# Patient Record
Sex: MALE | Race: WHITE
[De-identification: names, ages, dates, MRNs, and addresses within clinical notes are randomized per-mention and may not be internally consistent; named-entity substitution may affect disease eponyms.]

---

## 2021-01-06 ENCOUNTER — HOSPITAL ENCOUNTER (EMERGENCY)
Dept: HOSPITAL 12 - ER | Age: 26
Discharge: LEFT BEFORE DISCHARGE | End: 2021-01-06
Payer: SELF-PAY

## 2021-01-06 DIAGNOSIS — Z75.3: Primary | ICD-10-CM

## 2021-01-09 ENCOUNTER — HOSPITAL ENCOUNTER (EMERGENCY)
Dept: HOSPITAL 12 - ER | Age: 26
Discharge: HOME | End: 2021-01-09
Payer: SELF-PAY

## 2021-01-09 VITALS — WEIGHT: 129 LBS | BODY MASS INDEX: 20.73 KG/M2 | HEIGHT: 66 IN

## 2021-01-09 VITALS — DIASTOLIC BLOOD PRESSURE: 89 MMHG | SYSTOLIC BLOOD PRESSURE: 132 MMHG
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
## 2021-01-09 DIAGNOSIS — T25.221A: ICD-10-CM
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
## 2021-01-09 DIAGNOSIS — R03.0: ICD-10-CM

## 2021-01-09 DIAGNOSIS — Z59.0: ICD-10-CM

## 2021-01-09 DIAGNOSIS — T31.0: ICD-10-CM

## 2021-01-09 DIAGNOSIS — T25.222A: Primary | ICD-10-CM

## 2021-01-09 PROCEDURE — A4663 DIALYSIS BLOOD PRESSURE CUFF: HCPCS

## 2021-01-09 PROCEDURE — A4217 STERILE WATER/SALINE, 500 ML: HCPCS

## 2021-01-09 PROCEDURE — 2W2SX4Z DRESSING OF RIGHT FOOT USING BANDAGE: ICD-10-PCS

## 2021-01-09 PROCEDURE — 2W2TX4Z DRESSING OF LEFT FOOT USING BANDAGE: ICD-10-PCS

## 2021-01-09 SDOH — ECONOMIC STABILITY - HOUSING INSECURITY: HOMELESSNESS: Z59.0

## 2021-01-09 NOTE — NUR
Patient given written and verbal discharge instructions.  Patient verbalizes 
understanding of instructions.  Patient is ambulatory with steady gait.  
Refuses offer of shelter placement.  Patient given list of available shelters 
in surrounding area. Patient provided with sandwich and juice per patient 
request, VSS, no acute signs of distress, all belongings taken, AxOx3, patient 
refused all other services available at this time and will arrange own 
transportation.